# Patient Record
Sex: MALE | Race: WHITE | Employment: OTHER | URBAN - METROPOLITAN AREA
[De-identification: names, ages, dates, MRNs, and addresses within clinical notes are randomized per-mention and may not be internally consistent; named-entity substitution may affect disease eponyms.]

---

## 2017-02-27 PROCEDURE — 36415 COLL VENOUS BLD VENIPUNCTURE: CPT | Performed by: FAMILY MEDICINE

## 2017-02-27 PROCEDURE — 86803 HEPATITIS C AB TEST: CPT | Performed by: FAMILY MEDICINE

## 2017-03-29 PROCEDURE — 88305 TISSUE EXAM BY PATHOLOGIST: CPT | Performed by: INTERNAL MEDICINE

## 2017-06-14 ENCOUNTER — APPOINTMENT (OUTPATIENT)
Dept: MRI IMAGING | Facility: HOSPITAL | Age: 52
End: 2017-06-14
Attending: NURSE PRACTITIONER
Payer: COMMERCIAL

## 2017-06-14 ENCOUNTER — APPOINTMENT (OUTPATIENT)
Dept: CT IMAGING | Facility: HOSPITAL | Age: 52
End: 2017-06-14
Attending: EMERGENCY MEDICINE
Payer: COMMERCIAL

## 2017-06-14 ENCOUNTER — APPOINTMENT (OUTPATIENT)
Dept: GENERAL RADIOLOGY | Facility: HOSPITAL | Age: 52
End: 2017-06-14
Attending: EMERGENCY MEDICINE
Payer: COMMERCIAL

## 2017-06-14 ENCOUNTER — HOSPITAL ENCOUNTER (OUTPATIENT)
Facility: HOSPITAL | Age: 52
Setting detail: OBSERVATION
Discharge: HOME | End: 2017-06-15
Attending: EMERGENCY MEDICINE | Admitting: INTERNAL MEDICINE
Payer: COMMERCIAL

## 2017-06-14 DIAGNOSIS — R20.0 FACIAL NUMBNESS: Primary | ICD-10-CM

## 2017-06-14 DIAGNOSIS — R07.89 CHEST PRESSURE: ICD-10-CM

## 2017-06-14 PROCEDURE — 70498 CT ANGIOGRAPHY NECK: CPT | Performed by: EMERGENCY MEDICINE

## 2017-06-14 PROCEDURE — 70551 MRI BRAIN STEM W/O DYE: CPT | Performed by: NURSE PRACTITIONER

## 2017-06-14 PROCEDURE — 70496 CT ANGIOGRAPHY HEAD: CPT | Performed by: EMERGENCY MEDICINE

## 2017-06-14 PROCEDURE — 71010 XR CHEST AP PORTABLE  (CPT=71010): CPT | Performed by: EMERGENCY MEDICINE

## 2017-06-14 RX ORDER — ONDANSETRON 2 MG/ML
4 INJECTION INTRAMUSCULAR; INTRAVENOUS EVERY 6 HOURS PRN
Status: DISCONTINUED | OUTPATIENT
Start: 2017-06-14 | End: 2017-06-15

## 2017-06-14 RX ORDER — BISACODYL 10 MG
10 SUPPOSITORY, RECTAL RECTAL
Status: DISCONTINUED | OUTPATIENT
Start: 2017-06-14 | End: 2017-06-15

## 2017-06-14 RX ORDER — ASPIRIN 300 MG
300 SUPPOSITORY, RECTAL RECTAL DAILY
Status: DISCONTINUED | OUTPATIENT
Start: 2017-06-14 | End: 2017-06-15

## 2017-06-14 RX ORDER — SODIUM PHOSPHATE, DIBASIC AND SODIUM PHOSPHATE, MONOBASIC 7; 19 G/133ML; G/133ML
1 ENEMA RECTAL ONCE AS NEEDED
Status: DISCONTINUED | OUTPATIENT
Start: 2017-06-14 | End: 2017-06-15

## 2017-06-14 RX ORDER — SODIUM CHLORIDE 9 MG/ML
INJECTION, SOLUTION INTRAVENOUS CONTINUOUS
Status: DISCONTINUED | OUTPATIENT
Start: 2017-06-14 | End: 2017-06-15

## 2017-06-14 RX ORDER — POTASSIUM CHLORIDE 20 MEQ/1
40 TABLET, EXTENDED RELEASE ORAL ONCE
Status: COMPLETED | OUTPATIENT
Start: 2017-06-14 | End: 2017-06-14

## 2017-06-14 RX ORDER — ONDANSETRON 2 MG/ML
4 INJECTION INTRAMUSCULAR; INTRAVENOUS EVERY 6 HOURS PRN
Status: DISCONTINUED | OUTPATIENT
Start: 2017-06-14 | End: 2017-06-14

## 2017-06-14 RX ORDER — MORPHINE SULFATE 2 MG/ML
1 INJECTION, SOLUTION INTRAMUSCULAR; INTRAVENOUS EVERY 2 HOUR PRN
Status: DISCONTINUED | OUTPATIENT
Start: 2017-06-14 | End: 2017-06-15

## 2017-06-14 RX ORDER — ASPIRIN 81 MG/1
324 TABLET, CHEWABLE ORAL ONCE
Status: COMPLETED | OUTPATIENT
Start: 2017-06-14 | End: 2017-06-14

## 2017-06-14 RX ORDER — ACETAMINOPHEN 650 MG/1
650 SUPPOSITORY RECTAL EVERY 4 HOURS PRN
Status: DISCONTINUED | OUTPATIENT
Start: 2017-06-14 | End: 2017-06-15

## 2017-06-14 RX ORDER — ONDANSETRON 2 MG/ML
4 INJECTION INTRAMUSCULAR; INTRAVENOUS EVERY 4 HOURS PRN
Status: DISCONTINUED | OUTPATIENT
Start: 2017-06-14 | End: 2017-06-14

## 2017-06-14 RX ORDER — POLYETHYLENE GLYCOL 3350 17 G/17G
17 POWDER, FOR SOLUTION ORAL DAILY PRN
Status: DISCONTINUED | OUTPATIENT
Start: 2017-06-14 | End: 2017-06-14

## 2017-06-14 RX ORDER — BISACODYL 10 MG
10 SUPPOSITORY, RECTAL RECTAL
Status: DISCONTINUED | OUTPATIENT
Start: 2017-06-14 | End: 2017-06-14

## 2017-06-14 RX ORDER — FAMOTIDINE 10 MG/ML
20 INJECTION, SOLUTION INTRAVENOUS 2 TIMES DAILY
Status: DISCONTINUED | OUTPATIENT
Start: 2017-06-14 | End: 2017-06-15

## 2017-06-14 RX ORDER — FAMOTIDINE 20 MG/1
20 TABLET ORAL 2 TIMES DAILY
Status: DISCONTINUED | OUTPATIENT
Start: 2017-06-14 | End: 2017-06-15

## 2017-06-14 RX ORDER — ASPIRIN 325 MG
325 TABLET ORAL DAILY
Status: DISCONTINUED | OUTPATIENT
Start: 2017-06-14 | End: 2017-06-15

## 2017-06-14 RX ORDER — ENOXAPARIN SODIUM 100 MG/ML
40 INJECTION SUBCUTANEOUS DAILY
Status: DISCONTINUED | OUTPATIENT
Start: 2017-06-14 | End: 2017-06-15

## 2017-06-14 RX ORDER — ACETAMINOPHEN 325 MG/1
650 TABLET ORAL EVERY 6 HOURS PRN
Status: DISCONTINUED | OUTPATIENT
Start: 2017-06-14 | End: 2017-06-15

## 2017-06-14 RX ORDER — LABETALOL HYDROCHLORIDE 5 MG/ML
10 INJECTION, SOLUTION INTRAVENOUS EVERY 10 MIN PRN
Status: DISCONTINUED | OUTPATIENT
Start: 2017-06-14 | End: 2017-06-15

## 2017-06-14 RX ORDER — POLYETHYLENE GLYCOL 3350 17 G/17G
17 POWDER, FOR SOLUTION ORAL DAILY PRN
Status: DISCONTINUED | OUTPATIENT
Start: 2017-06-14 | End: 2017-06-15

## 2017-06-14 RX ORDER — MORPHINE SULFATE 2 MG/ML
2 INJECTION, SOLUTION INTRAMUSCULAR; INTRAVENOUS EVERY 2 HOUR PRN
Status: DISCONTINUED | OUTPATIENT
Start: 2017-06-14 | End: 2017-06-15

## 2017-06-14 RX ORDER — SENNOSIDES 8.6 MG
17.2 TABLET ORAL NIGHTLY
Status: DISCONTINUED | OUTPATIENT
Start: 2017-06-14 | End: 2017-06-15

## 2017-06-14 RX ORDER — PHENYLEPHRINE HCL IN 0.9% NACL 50MG/250ML
PLASTIC BAG, INJECTION (ML) INTRAVENOUS CONTINUOUS PRN
Status: DISCONTINUED | OUTPATIENT
Start: 2017-06-14 | End: 2017-06-15

## 2017-06-14 RX ORDER — ACETAMINOPHEN 325 MG/1
650 TABLET ORAL EVERY 4 HOURS PRN
Status: DISCONTINUED | OUTPATIENT
Start: 2017-06-14 | End: 2017-06-15

## 2017-06-14 RX ORDER — DOCUSATE SODIUM 100 MG/1
100 CAPSULE, LIQUID FILLED ORAL 2 TIMES DAILY
Status: DISCONTINUED | OUTPATIENT
Start: 2017-06-14 | End: 2017-06-15

## 2017-06-14 RX ORDER — ATORVASTATIN CALCIUM 40 MG/1
80 TABLET, FILM COATED ORAL NIGHTLY
Status: DISCONTINUED | OUTPATIENT
Start: 2017-06-14 | End: 2017-06-15

## 2017-06-14 RX ORDER — OMEPRAZOLE 20 MG/1
20 CAPSULE, DELAYED RELEASE ORAL
COMMUNITY
End: 2018-04-09

## 2017-06-14 RX ORDER — METOCLOPRAMIDE HYDROCHLORIDE 5 MG/ML
10 INJECTION INTRAMUSCULAR; INTRAVENOUS EVERY 8 HOURS PRN
Status: DISCONTINUED | OUTPATIENT
Start: 2017-06-14 | End: 2017-06-15

## 2017-06-14 RX ORDER — SODIUM CHLORIDE 9 MG/ML
INJECTION, SOLUTION INTRAVENOUS CONTINUOUS
Status: ACTIVE | OUTPATIENT
Start: 2017-06-14 | End: 2017-06-14

## 2017-06-14 NOTE — PLAN OF CARE
Impaired Swallowing    • Minimize aspiration risk Progressing        Patient/Family Goals    • Patient/Family Long Term Goal Progressing    • Patient/Family Short Term Goal Progressing            Pt admitted from ER. Pt a/ox4, VSS, on RA, NSR on telemetry.

## 2017-06-14 NOTE — ED NOTES
Called to room by pt c/o sensation to right jaw. Pt states it is not numb or tingling, and there is not a decrease in sensation, but just a \"sensation\" right jaw, that just began. Pt status otherwise appears unchanged.

## 2017-06-14 NOTE — PROGRESS NOTES
[id number] [facility] Initial Evaluation    Sol Uribe Patient Status:  Emergency    1965 MRN [id number]   Location [facility] Attending Jose Reddy MD   Hosp Day # 0 PCP Piyush Benson MD     REASON FOR Comment adenoma, hemorrhoids, repeat 2019.     COLONOSCOPY  2/18/2014    Comment Procedure: COLONOSCOPY;  Surgeon: Sadi Fernandez MD;  Location: [facility] ENDOSCOPY    EGD SCOPE         FAMILY HISTORY:  family history includes Alcohol and Other Disorders Associate Stroke Brain and CTA Brain/Neck- results pending    Patient seen in ED for sudden onset of numbness to skull and jaw. Symptoms resolved upon arrival.  NIH 0. Discussed with Dr. Felix Silver, patient is not a candidate for tPA due to resolution of symptoms.

## 2017-06-14 NOTE — ED PROVIDER NOTES
Patient Seen in: [facility] Emergency Department    History   Patient presents with:  Numbness Weakness (neurologic)    Stated Complaint: numbness to jaw/headache x 3 days    HPI    Patient is a 47-year-old male presents emergency room with history Age of Onset   • Genito-Urinary Disorder Father      prostate disease   • Gastro-Intestinal Disorder Father      severe constipation?    • Neurological Disorder Father      Barb Steward   • Cancer Father 76     Prostate CA   • Genetic Disease Other      W wheeze. There is good equal air entry bilaterally. HEART: Regular rate and rhythm. Normal S1, S2 no S3, or S4. No murmur. ABDOMEN: There is no focal tenderness to palpation appreciated anywhere throughout the abdomen.  There is no guarding, no rebound, no RAINBOW DRAW LAVENDER   RAINBOW DRAW LIGHT GREEN   RAINBOW DRAW GOLD   CBC W/ DIFFERENTIAL      EKG    Rate, intervals and axes as noted on EKG Report.   Rate: 92  Rhythm: Normal sinus rhythm  Reading: No acute ischemic change noted            MDM   Chest

## 2017-06-14 NOTE — SLP NOTE
ADULT SWALLOWING EVALUATION    ASSESSMENT & PLAN   ASSESSMENT  Pt seen at bedside this PM for bedside swallow evaluation. RN approved session. Speech consulted per CVA protocol. Pt cooperative, pleasant, and alert.  Per RN documentation and report, pt passe Prior to Admission: Regular; Thin liquids  Precautions: None    Comments: Winston Wheeler is a(n) 46year old male, with no significant PMH who presented to the ED with complaints of sudden onset of numbness to his skull and then down to his jaw.  Symptoms MOTOR EXAMINATION  Dentition: Functional  Symmetry: Within Functional Limits  Strength:  Within Functional Limits  Tone: Within Functional Limits  Range of Motion: Within Functional Limits  Rate of Motion: Within Functional Limits    Voice Quality: Clear  R

## 2017-06-14 NOTE — H&P
[facility] Hospitalist H&P       CC: n/t to face    PCP: Zachary Guzman MD    History of Present Illness:     Pt is a 45 yo with HL, GERD, who is admitted for n/t to face. Says today, noted n/t to top of R head that the radiated to b/l jaw.   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     Dictated by: Brody Puente MD on 6/14/2017 at 11:46     Approved by: Brody Puente MD            Ct Stroke Bra

## 2017-06-14 NOTE — ED INITIAL ASSESSMENT (HPI)
Pt presents to the ED with complaints of sudden onset numbness to jaw and head approximately 15 minutes prior. Pt states symptoms lasted approximately 5 minutes, but have since subsided. Pt states he has had a headache for past couple of days.  Pt states he

## 2017-06-14 NOTE — PROGRESS NOTES
06/14/17 1814   Clinical Encounter Type   Visited With Patient and family together  (wife at bedside)   Routine Visit Introduction   Continue Visiting No   Patient's Supportive Strategies/Resources  provided emotional support, including active l

## 2017-06-14 NOTE — ED NOTES
CT complete. Pt states he had a sensation of tightness across chest after dye was injected,but symptom has now subsided. To monitor.

## 2017-06-15 ENCOUNTER — APPOINTMENT (OUTPATIENT)
Dept: CV DIAGNOSTICS | Facility: HOSPITAL | Age: 52
End: 2017-06-15
Attending: NURSE PRACTITIONER
Payer: COMMERCIAL

## 2017-06-15 VITALS
WEIGHT: 210.13 LBS | OXYGEN SATURATION: 100 % | HEIGHT: 68 IN | SYSTOLIC BLOOD PRESSURE: 117 MMHG | BODY MASS INDEX: 31.85 KG/M2 | DIASTOLIC BLOOD PRESSURE: 74 MMHG | HEART RATE: 62 BPM | RESPIRATION RATE: 16 BRPM | TEMPERATURE: 98 F

## 2017-06-15 PROCEDURE — 99244 OFF/OP CNSLTJ NEW/EST MOD 40: CPT | Performed by: OTHER

## 2017-06-15 PROCEDURE — 93306 TTE W/DOPPLER COMPLETE: CPT | Performed by: NURSE PRACTITIONER

## 2017-06-15 NOTE — CM/SW NOTE
06/15/17 1000   CM/SW Screening   Referral Source Nurse   Information Source Chart review;Nursing rounds   Patient Status Prior to Admission   Independent with ADLs and Mobility Yes     Screen, no needs.      SW received order to verify support and HH ne

## 2017-06-15 NOTE — PHYSICAL THERAPY NOTE
Received PT orders. Spoke with RN who stated Pt is dressed and discharging home in a few minutes, no need to complete PT evaluation prior to d/c as workup negative for CVA and symptoms have resolved.

## 2017-06-15 NOTE — PAYOR COMM NOTE
--------------  ADMISSION REVIEW     Payor: [facility] [place], [place] [address] JUMA Handy.    6/14    ED        [address]   Patient presents with:  Numbness Weakness     Stated Complaint: numbness to jaw/headache x 3 days      Patient is a 66-year-old male presents emergency room with history Motor strength is 5 over 5 in all 4 extremities. There are no gross motor or sensory deficits appreciated.  Cranial nerves II through XII are intact.              Diagnosis  Date    •  Rotator cuff tendonitis  0802    •  Mixed dyslipidemia  2/16/2015    •  to have a stroke score of 0 here in the ER.    A code stroke was called based on the timeframe with the patient's symptoms.  Patient is not a TPA candidate secondary to resolution of symptoms and low stroke score at this time.  Patient was given aspirin he

## 2017-06-15 NOTE — PROGRESS NOTES
NURSING DISCHARGE NOTE    Discharged Home via Ambulatory. Accompanied by Support staff  Belongings Taken by patient/family. Patient was given med rec and discharge instructions. No new medications prescribed.  Follow up appointments discussed, Jerod Galvan

## 2017-06-15 NOTE — PLAN OF CARE
CARDIOVASCULAR - ADULT    • Maintains optimal cardiac output and hemodynamic stability Progressing        Impaired Activities of Daily Living    • Achieve highest/safest level of independence in self care Progressing        Impaired Swallowing    • Minimiz

## 2017-06-15 NOTE — CONSULTS
[address]    PATIENT'S NAME: Maryellen PELAYO   ATTENDING PHYSICIAN: Jamel Hdez. Kathy Napier MD   CONSULTING PHYSICIAN: Joao Enriquez M.D.    PATIENT ACCOUNT#:   [de-identified]    LOCATION:  [address] [place] [address]  MEDICAL RECORD #:   [id number]       DATE OF BIRTH:  [birth year] for at least a year. He has no major headache that he can remember. However, since Tuesday he developed a headache mild to moderate degree on the left side behind the eye with light sensitivity. No nausea, vomiting.     He used to do exercise regularly b resolved now. No need to give preventive medication since this is infrequent in the past.  I have discussed with the patient how to do the nonmedication way for migraine prevention, and he fully understood.   I would think the patient is stable to be disch

## 2017-08-02 PROCEDURE — 88305 TISSUE EXAM BY PATHOLOGIST: CPT | Performed by: INTERNAL MEDICINE

## 2018-04-09 PROBLEM — R20.0 FACIAL NUMBNESS: Status: RESOLVED | Noted: 2017-06-14 | Resolved: 2018-04-09

## 2018-04-09 PROBLEM — R07.89 CHEST PRESSURE: Status: RESOLVED | Noted: 2017-06-14 | Resolved: 2018-04-09

## 2019-04-10 PROBLEM — R73.02 IMPAIRED GLUCOSE TOLERANCE: Status: ACTIVE | Noted: 2019-04-10

## 2019-08-03 NOTE — PROGRESS NOTES
[facility] Hospitalist Progress Note     PCP: Cris Murcia MD    CC:  Follow up    SUBJECTIVE:  Pt sitting up in bed, wife and neurology at bedside. Pt with no n/t to face overnight. Does not think he has a HA right now.       OBJECTIVE:  Temp:  [97.9 °F (36.6 mL/hr at 06/15/17 0500   • NiCARdipine     • phenylephrine in NaCl       acetaminophen **OR** acetaminophen, morphINE sulfate (PF) **OR** morphINE sulfate (PF), Labetalol HCl, NiCARdipine, phenylephrine in NaCl, PEG 3350, magnesium hydroxide, bisacodyl, FL none

## 2025-02-14 NOTE — ED NOTES
Neuro exam remains unchanged at this time. Pt requesting water but instructed to remain NPO until test results complete. Pt's wife at bedside. Refill  TSH : nl

## (undated) NOTE — IP AVS SNAPSHOT
[facility] [place] [address] [place], [address] ~ [phone]                Discharge Summary   6/14/2017    Winston Wheeler           Admission Information        Provider Department    6/14/2017 Eric Gross MD  [facility] [address] [address]   [phone]              Immunization History as of 6/15/2017  Reviewed on 6/14/2017    TDAP 1/27/2014      Recent Hematology Lab Results  (Last 3 results in the past 90 days)    WBC RBC Hemoglobin Hematocrit MCV MCH MCHC RDW [place] coverage. Patient [address] is a Federal Navigator program that can help with your Affordable Care Act coverage, as well as all types of Medicaid plans.   To get signed up and covered, please call [phone] and ask to get set up for an insuran movement in 2+ days, diarrhea